# Patient Record
Sex: FEMALE | Race: BLACK OR AFRICAN AMERICAN | NOT HISPANIC OR LATINO | URBAN - METROPOLITAN AREA
[De-identification: names, ages, dates, MRNs, and addresses within clinical notes are randomized per-mention and may not be internally consistent; named-entity substitution may affect disease eponyms.]

---

## 2017-09-23 ENCOUNTER — EMERGENCY (EMERGENCY)
Facility: HOSPITAL | Age: 52
LOS: 0 days | Discharge: HOME | End: 2017-09-23
Attending: EMERGENCY MEDICINE
Payer: COMMERCIAL

## 2017-09-23 VITALS
TEMPERATURE: 98 F | RESPIRATION RATE: 18 BRPM | SYSTOLIC BLOOD PRESSURE: 174 MMHG | DIASTOLIC BLOOD PRESSURE: 87 MMHG | HEART RATE: 53 BPM | OXYGEN SATURATION: 99 %

## 2017-09-23 VITALS
DIASTOLIC BLOOD PRESSURE: 100 MMHG | SYSTOLIC BLOOD PRESSURE: 179 MMHG | HEIGHT: 64 IN | RESPIRATION RATE: 18 BRPM | OXYGEN SATURATION: 100 % | HEART RATE: 58 BPM | WEIGHT: 181 LBS | TEMPERATURE: 97 F

## 2017-09-23 PROCEDURE — 70450 CT HEAD/BRAIN W/O DYE: CPT | Mod: 26

## 2017-09-23 PROCEDURE — 99284 EMERGENCY DEPT VISIT MOD MDM: CPT | Mod: 25

## 2017-09-23 RX ORDER — METFORMIN HYDROCHLORIDE 850 MG/1
1 TABLET ORAL
Qty: 0 | Refills: 0 | COMMUNITY

## 2017-09-23 RX ORDER — ATENOLOL 25 MG/1
1 TABLET ORAL
Qty: 0 | Refills: 0 | COMMUNITY

## 2017-09-23 NOTE — ED PROVIDER NOTE - MEDICAL DECISION MAKING DETAILS
Imaging reviewed. Patient asymptomatic throughout ED course. Patient currently in good condition and now discharged with care instructions. patient is to follow up with pmd. Discussed results and outcome of testing with the patient.  Patient advised to please follow up with their primary care doctor within the next 24 hours and return to the Emergency Department for worsening symptoms or any other concerns.  Patient advised that their doctor may call  to follow up on the specific results of the tests performed today in the emergency department.

## 2017-09-23 NOTE — ED PROVIDER NOTE - OBJECTIVE STATEMENT
Pertinent PMH/PSH/FHx/SHx and Review of Systems contained within:  53 yo f PMH of HTN presents in ED c/o right sided headache associated with high BP at home for which she took atenolol. In ED, patient reports now feeling well. No aggravating or relieving factors, No fever/chills, No photophobia/eye pain/changes in vision, No ear pain/sore throat/dysphagia, No chest pain/palpitations, no SOB/cough/wheeze/stridor, No abdominal pain, No N/V/D, no dysuria/frequency/discharge, No neck/back pain, no rash, no changes in neurological status/function.

## 2017-09-23 NOTE — ED ADULT TRIAGE NOTE - CHIEF COMPLAINT QUOTE
walked up around 45 minutes ago with headache and blood [id number],pt took, atenolol 50mg, headache is better

## 2017-09-23 NOTE — ED ADULT NURSE NOTE - CHIEF COMPLAINT QUOTE
walked up around 45 minutes ago with headache and blood [id number],pt took, atenolol 50mg, headache is better

## 2017-09-27 DIAGNOSIS — Z79.4 LONG TERM (CURRENT) USE OF INSULIN: ICD-10-CM

## 2017-09-27 DIAGNOSIS — I10 ESSENTIAL (PRIMARY) HYPERTENSION: ICD-10-CM

## 2017-09-27 DIAGNOSIS — R51 HEADACHE: ICD-10-CM

## 2017-09-27 DIAGNOSIS — E10.10 TYPE 1 DIABETES MELLITUS WITH KETOACIDOSIS WITHOUT COMA: ICD-10-CM

## 2018-07-04 NOTE — ED PROVIDER NOTE - CPE EDP EYES NORM
"Sandy Yang  Chief Complaint   Patient presents with   • Rash     bilateral hands (palms) and feet (soles), x2 days   Patient was seen yesterday for the same. Patient states that she was seen by infectious disease and they \"thought [she] had francisco j mountain spotted fever or lyme disease\". Patient has recently been travelling in the [facility] [place] as well as [place].  Patient states that rash is painful. Patient was given an RX for an unknown antibiotic yesterday. Per patient she previously had body aches as well as a fever, none at this time. Lungs CTA, no increased WOB. Patient awake, alert, interactive, NAD. Patients infant daughter developed a rash to her full body yesterday.   /78   Pulse 82   Temp 36.7 °C (98.1 °F)   Resp 14   Wt 68 kg (150 lb)   LMP 07/18/2017   SpO2 96%   Breastfeeding? Yes   BMI 24.96 kg/m²   "
normal...